# Patient Record
Sex: FEMALE | Race: WHITE | NOT HISPANIC OR LATINO
[De-identification: names, ages, dates, MRNs, and addresses within clinical notes are randomized per-mention and may not be internally consistent; named-entity substitution may affect disease eponyms.]

---

## 2019-03-23 PROBLEM — Z00.00 ENCOUNTER FOR PREVENTIVE HEALTH EXAMINATION: Status: ACTIVE | Noted: 2019-03-23

## 2019-04-22 ENCOUNTER — APPOINTMENT (OUTPATIENT)
Dept: NEUROLOGY | Facility: CLINIC | Age: 84
End: 2019-04-22
Payer: MEDICARE

## 2019-04-22 VITALS
TEMPERATURE: 98 F | HEART RATE: 75 BPM | BODY MASS INDEX: 18.77 KG/M2 | WEIGHT: 102 LBS | DIASTOLIC BLOOD PRESSURE: 81 MMHG | SYSTOLIC BLOOD PRESSURE: 156 MMHG | HEIGHT: 62 IN

## 2019-04-22 DIAGNOSIS — F02.80 ALZHEIMER'S DISEASE WITH LATE ONSET: ICD-10-CM

## 2019-04-22 DIAGNOSIS — G30.1 ALZHEIMER'S DISEASE WITH LATE ONSET: ICD-10-CM

## 2019-04-22 PROCEDURE — 99205 OFFICE O/P NEW HI 60 MIN: CPT

## 2019-04-22 RX ORDER — PRAVASTATIN SODIUM 10 MG/1
10 TABLET ORAL
Refills: 0 | Status: ACTIVE | COMMUNITY

## 2019-04-22 RX ORDER — DONEPEZIL HYDROCHLORIDE 5 MG/1
5 TABLET ORAL DAILY
Qty: 30 | Refills: 0 | Status: ACTIVE | COMMUNITY
Start: 2019-04-22 | End: 1900-01-01

## 2019-04-22 RX ORDER — LIDOCAINE HCL 4 %
250 MCG CREAM (GRAM) TOPICAL
Refills: 0 | Status: ACTIVE | COMMUNITY

## 2019-04-22 RX ORDER — AMLODIPINE BESYLATE 10 MG/1
10 TABLET ORAL
Refills: 0 | Status: ACTIVE | COMMUNITY

## 2019-04-22 RX ORDER — DONEPEZIL HYDROCHLORIDE 10 MG/1
10 TABLET ORAL DAILY
Qty: 30 | Refills: 5 | Status: ACTIVE | COMMUNITY
Start: 2019-04-22 | End: 1900-01-01

## 2019-04-22 RX ORDER — CALCIUM CARBONATE/VITAMIN D3 600 MG-10
TABLET ORAL
Refills: 0 | Status: ACTIVE | COMMUNITY

## 2019-04-25 NOTE — HISTORY OF PRESENT ILLNESS
[FreeTextEntry1] : Ms. Davis is an 87-year-old woman with slowly progressively worsening cognitive function over at least the last 3 years. The history is from her 2 children. She was living in [place] until last year when her son felt she needed to move in with him. She has had progressively worsening short-term memory with preservation of long term memory. Last year she was no longer able to drive safely. She was not able to take her medications. Her son took over her finances a few years ago. She's had a few years of progressively worsening word finding difficulty. She repeats the same phrases and asks the same questions. She is no longer able to use the remote control. A few years ago she was unable to cook for herself and her family had all of her food delivered. She is no longer reading. She does not write. Her function is significantly improved after metoprolol was stopped-she is more alert,  more active. She is not able to cook but still able to use the automatic coffee machine. She is able to perform her basic ADLs-getting ready for bed, showering, dressing, brushing teeth.\par She has had a tremor for about one year. It is noticeable when she is eating but it does not affect her function significantly.\par

## 2019-04-25 NOTE — CONSULT LETTER
[Dear  ___] : Dear  [unfilled], [FreeTextEntry1] : I had the pleasure of evaluating your patient, JAZLYN HUNTER. Please see the assessment section below for a summery of my diagnostic impression and plan.\par \par Thank you very much for allowing me to participate in the care of this patient. If you have any questions, please do not hesitate to contact me. \par \par Sincerely,\par \par Yvette Brennan MD\par

## 2019-04-25 NOTE — ASSESSMENT
[FreeTextEntry1] : Ms. Davis is an 87-year-old woman with probable Alzheimer's disease.\par MRI brain\par Start Aricept 5 mg daily for one month and then 10 mg daily.\par Her vitamin B12 level is 318 – I recommend oral replacement to keep the level above 400 under which neurological symptoms can be seen.\par I discussed in detail with her 2 children - the diagnosis, prognosis, treatment, social support.\par

## 2019-04-25 NOTE — REVIEW OF SYSTEMS
[Dizziness] : dizziness [Negative] : Heme/Lymph [FreeTextEntry2] : fatigue [de-identified] : dry skin [FreeTextEntry9] : muscle aches [de-identified] : heat intolerance,cold intolerance

## 2019-07-29 ENCOUNTER — APPOINTMENT (OUTPATIENT)
Dept: NEUROLOGY | Facility: CLINIC | Age: 84
End: 2019-07-29

## 2024-04-16 NOTE — PHYSICAL EXAM
EKG performed per Dr's order.     [___ / 30] : the patient achieved a total score of [unfilled] /30 [___ / 5] : Visuospatial / Executive: [unfilled] / 5 [___ / 3] : Attention (Serial 7 subtraction): [unfilled] / 3 [___ / 2] : Abstraction: [unfilled] / 2 [___ / 1] : Fluency: [unfilled] / 1 [___ / 5] : Delayed Recall: [unfilled] / 5 [___ / 6] : Orientation: [unfilled] / 6 [FreeTextEntry1] : Physical examination \par General: No acute distress, Awake, Alert.   \par Fundus: disc margins sharp.   \par Neck: no Carotid bruit.   \par Cardiovascular: Normal rate, Regular rhythm, No murmur.  \par \par \par Mental status \par MoCA - \par 4/22/2019 - 6/30; memory is 0/5\par \par Cranial Nerves \par II: VFF  \par III, IV, VI: Pupils irregular reactive, EOMI.   \par V: Facial sensation is normal B/L.   \par VII: Facial strength is normal B/L. \par VIII: Decreased hearing, bilaterally. \par IX, X: Palate is midline and elevates symmetrically.   \par XI: Trapezius normal strength.   \par XII: Tongue midline without atrophy or fasciculations. \par \par Motor exam  \par Muscle tone - cogwheel rigidity and paratonia.   \par No atrophy or fasciculations \par Muscle Strength: arms and legs, proximal and distal flexors and extensors are normal \par \par No UE drift.\par \par Reflexes \par All present, normal, and symmetrical.   \par Plantars right: mute.   \par Plantars left: mute.   \par \par \par Coordination \par Slow, no ataxia - FNF, YUN, HKS. \par \par Sensory \par Intact sensation to vibration and cold. \par \par \par Gait \par Slow, wide based gait. \par \par  Detail Level: Zone Modify Regimen: Restart clobetasol 0.05 % topical ointment apply topically to affected area on L elbow BID for flares